# Patient Record
Sex: MALE | Race: WHITE | NOT HISPANIC OR LATINO | URBAN - METROPOLITAN AREA
[De-identification: names, ages, dates, MRNs, and addresses within clinical notes are randomized per-mention and may not be internally consistent; named-entity substitution may affect disease eponyms.]

---

## 2020-07-07 ENCOUNTER — LAB VISIT (OUTPATIENT)
Dept: LAB | Facility: HOSPITAL | Age: 60
End: 2020-07-07
Attending: FAMILY MEDICINE
Payer: COMMERCIAL

## 2020-07-07 ENCOUNTER — OFFICE VISIT (OUTPATIENT)
Dept: INTERNAL MEDICINE | Facility: CLINIC | Age: 60
End: 2020-07-07
Payer: COMMERCIAL

## 2020-07-07 VITALS
SYSTOLIC BLOOD PRESSURE: 130 MMHG | WEIGHT: 243.38 LBS | OXYGEN SATURATION: 98 % | DIASTOLIC BLOOD PRESSURE: 72 MMHG | HEIGHT: 70 IN | BODY MASS INDEX: 34.84 KG/M2 | TEMPERATURE: 96 F | HEART RATE: 71 BPM

## 2020-07-07 DIAGNOSIS — E78.00 HYPERCHOLESTEROLEMIA: ICD-10-CM

## 2020-07-07 DIAGNOSIS — M10.9 GOUT, UNSPECIFIED CAUSE, UNSPECIFIED CHRONICITY, UNSPECIFIED SITE: ICD-10-CM

## 2020-07-07 DIAGNOSIS — N11.9 CHRONIC PYELONEPHRITIS: Primary | ICD-10-CM

## 2020-07-07 PROCEDURE — 99203 OFFICE O/P NEW LOW 30 MIN: CPT | Mod: S$GLB,,, | Performed by: FAMILY MEDICINE

## 2020-07-07 PROCEDURE — 36415 COLL VENOUS BLD VENIPUNCTURE: CPT

## 2020-07-07 PROCEDURE — 99999 PR PBB SHADOW E&M-EST. PATIENT-LVL IV: CPT | Mod: PBBFAC,,, | Performed by: FAMILY MEDICINE

## 2020-07-07 PROCEDURE — 80053 COMPREHEN METABOLIC PANEL: CPT

## 2020-07-07 PROCEDURE — 99203 PR OFFICE/OUTPT VISIT, NEW, LEVL III, 30-44 MIN: ICD-10-PCS | Mod: S$GLB,,, | Performed by: FAMILY MEDICINE

## 2020-07-07 PROCEDURE — 99999 PR PBB SHADOW E&M-EST. PATIENT-LVL IV: ICD-10-PCS | Mod: PBBFAC,,, | Performed by: FAMILY MEDICINE

## 2020-07-07 PROCEDURE — 3008F BODY MASS INDEX DOCD: CPT | Mod: CPTII,S$GLB,, | Performed by: FAMILY MEDICINE

## 2020-07-07 PROCEDURE — 3008F PR BODY MASS INDEX (BMI) DOCUMENTED: ICD-10-PCS | Mod: CPTII,S$GLB,, | Performed by: FAMILY MEDICINE

## 2020-07-07 RX ORDER — TAMSULOSIN HYDROCHLORIDE 0.4 MG/1
0.4 CAPSULE ORAL DAILY
Qty: 90 CAPSULE | Refills: 1 | Status: SHIPPED | OUTPATIENT
Start: 2020-07-07

## 2020-07-07 RX ORDER — ATORVASTATIN CALCIUM 20 MG/1
20 TABLET, FILM COATED ORAL DAILY
Qty: 90 TABLET | Refills: 1 | Status: SHIPPED | OUTPATIENT
Start: 2020-07-07

## 2020-07-07 RX ORDER — BISOPROLOL FUMARATE 5 MG/1
2.5 TABLET, FILM COATED ORAL DAILY
Qty: 45 TABLET | Refills: 1 | Status: SHIPPED | OUTPATIENT
Start: 2020-07-07

## 2020-07-07 RX ORDER — FEBUXOSTAT 80 MG/1
TABLET, FILM COATED ORAL
Qty: 45 TABLET | Refills: 1 | Status: SHIPPED | OUTPATIENT
Start: 2020-07-07 | End: 2020-07-16

## 2020-07-07 RX ORDER — BISOPROLOL FUMARATE 5 MG/1
2.5 TABLET, FILM COATED ORAL DAILY
COMMUNITY
End: 2020-07-07 | Stop reason: SDUPTHER

## 2020-07-07 RX ORDER — ESOMEPRAZOLE MAGNESIUM 40 MG/1
40 CAPSULE, DELAYED RELEASE ORAL
COMMUNITY
End: 2020-07-07 | Stop reason: SDUPTHER

## 2020-07-07 RX ORDER — ATORVASTATIN CALCIUM 20 MG/1
20 TABLET, FILM COATED ORAL DAILY
COMMUNITY
End: 2020-07-07 | Stop reason: SDUPTHER

## 2020-07-07 RX ORDER — FEBUXOSTAT 80 MG/1
40 TABLET, FILM COATED ORAL
COMMUNITY
End: 2020-07-07 | Stop reason: SDUPTHER

## 2020-07-07 RX ORDER — TAMSULOSIN HYDROCHLORIDE 0.4 MG/1
0.4 CAPSULE ORAL DAILY
COMMUNITY
End: 2020-07-07 | Stop reason: SDUPTHER

## 2020-07-07 RX ORDER — ESOMEPRAZOLE MAGNESIUM 40 MG/1
40 CAPSULE, DELAYED RELEASE ORAL
Qty: 90 CAPSULE | Refills: 1 | Status: SHIPPED | OUTPATIENT
Start: 2020-07-07

## 2020-07-07 NOTE — PROGRESS NOTES
Subjective:       Patient ID: John Paul Green is a 60 y.o. male.    Chief Complaint: Annual Exam    Establish Care:      Pt is a 60 year old with HTN, Cholesterol and Gout, has some kidney issues 2nd to stone blockage in the left kidney. This resulted in left kidney pyelonephritis. Pt reports tub were placed in the left kidney and since has been not.     Review of Systems   Constitutional: Negative for activity change.   HENT: Negative for hearing loss and trouble swallowing.    Eyes: Negative for discharge.   Respiratory: Negative for chest tightness and wheezing.    Cardiovascular: Negative for chest pain and palpitations.   Gastrointestinal: Negative for constipation, diarrhea and vomiting.   Genitourinary: Negative for difficulty urinating and hematuria.   Neurological: Negative for headaches.   Psychiatric/Behavioral: Negative for dysphoric mood.         Objective:      Physical Exam  Constitutional:       Appearance: Normal appearance.   Neck:      Musculoskeletal: Normal range of motion.   Neurological:      Mental Status: He is alert.         Assessment:       1. Chronic pyelonephritis    2. Gout, unspecified cause, unspecified chronicity, unspecified site    3. Hypercholesterolemia        Plan:       Chronic pyelonephritis  -     Ambulatory referral/consult to Urology; Future; Expected date: 07/14/2020    Gout, unspecified cause, unspecified chronicity, unspecified site    Hypercholesterolemia  Comments:  Pt cholesterol is well controlled  Orders:  -     Comprehensive metabolic panel; Future; Expected date: 07/07/2020

## 2020-07-08 LAB
ALBUMIN SERPL BCP-MCNC: 3.9 G/DL (ref 3.5–5.2)
ALP SERPL-CCNC: 44 U/L (ref 55–135)
ALT SERPL W/O P-5'-P-CCNC: 29 U/L (ref 10–44)
ANION GAP SERPL CALC-SCNC: 9 MMOL/L (ref 8–16)
AST SERPL-CCNC: 20 U/L (ref 10–40)
BILIRUB SERPL-MCNC: 0.9 MG/DL (ref 0.1–1)
BUN SERPL-MCNC: 22 MG/DL (ref 8–23)
CALCIUM SERPL-MCNC: 9.6 MG/DL (ref 8.7–10.5)
CHLORIDE SERPL-SCNC: 105 MMOL/L (ref 95–110)
CO2 SERPL-SCNC: 26 MMOL/L (ref 23–29)
CREAT SERPL-MCNC: 1 MG/DL (ref 0.5–1.4)
EST. GFR  (AFRICAN AMERICAN): >60 ML/MIN/1.73 M^2
EST. GFR  (NON AFRICAN AMERICAN): >60 ML/MIN/1.73 M^2
GLUCOSE SERPL-MCNC: 87 MG/DL (ref 70–110)
POTASSIUM SERPL-SCNC: 4.8 MMOL/L (ref 3.5–5.1)
PROT SERPL-MCNC: 7.3 G/DL (ref 6–8.4)
SODIUM SERPL-SCNC: 140 MMOL/L (ref 136–145)

## 2020-07-15 ENCOUNTER — TELEPHONE (OUTPATIENT)
Dept: INTERNAL MEDICINE | Facility: CLINIC | Age: 60
End: 2020-07-15

## 2020-07-15 NOTE — TELEPHONE ENCOUNTER
----- Message from Miroslava Flores sent at 7/15/2020  2:34 PM CDT -----  Contact: self/[phone]  Type:  Patient Returning Call    Who Called:John Paul Green  Who Left Message for Patient:Heather  Does the patient know what this is regarding?:yes  Would the patient rather a call back or a response via [facility]? Call back   Best Call Back Number:[phone]  Additional Information:

## 2020-07-16 RX ORDER — ALLOPURINOL 100 MG/1
100 TABLET ORAL 2 TIMES DAILY
Qty: 180 TABLET | Refills: 1 | Status: SHIPPED | OUTPATIENT
Start: 2020-07-16

## 2020-08-12 ENCOUNTER — OFFICE VISIT (OUTPATIENT)
Dept: UROLOGY | Facility: CLINIC | Age: 60
End: 2020-08-12
Payer: COMMERCIAL

## 2020-08-12 ENCOUNTER — LAB VISIT (OUTPATIENT)
Dept: LAB | Facility: HOSPITAL | Age: 60
End: 2020-08-12
Attending: UROLOGY
Payer: COMMERCIAL

## 2020-08-12 VITALS
BODY MASS INDEX: 34.33 KG/M2 | DIASTOLIC BLOOD PRESSURE: 72 MMHG | TEMPERATURE: 97 F | WEIGHT: 242.5 LBS | SYSTOLIC BLOOD PRESSURE: 128 MMHG

## 2020-08-12 DIAGNOSIS — R35.1 BENIGN PROSTATIC HYPERPLASIA WITH NOCTURIA: ICD-10-CM

## 2020-08-12 DIAGNOSIS — N40.1 BENIGN PROSTATIC HYPERPLASIA WITH NOCTURIA: ICD-10-CM

## 2020-08-12 DIAGNOSIS — N20.0 NEPHROLITHIASIS: ICD-10-CM

## 2020-08-12 DIAGNOSIS — Z98.890 S/P URETERAL REIMPLANTATION: Primary | ICD-10-CM

## 2020-08-12 PROBLEM — N11.9 CHRONIC PYELONEPHRITIS: Status: ACTIVE | Noted: 2020-08-12

## 2020-08-12 PROCEDURE — 3008F BODY MASS INDEX DOCD: CPT | Mod: CPTII,S$GLB,, | Performed by: UROLOGY

## 2020-08-12 PROCEDURE — 99999 PR PBB SHADOW E&M-EST. PATIENT-LVL III: CPT | Mod: PBBFAC,,, | Performed by: UROLOGY

## 2020-08-12 PROCEDURE — 99203 OFFICE O/P NEW LOW 30 MIN: CPT | Mod: S$GLB,,, | Performed by: UROLOGY

## 2020-08-12 PROCEDURE — 3008F PR BODY MASS INDEX (BMI) DOCUMENTED: ICD-10-PCS | Mod: CPTII,S$GLB,, | Performed by: UROLOGY

## 2020-08-12 PROCEDURE — 84153 ASSAY OF PSA TOTAL: CPT

## 2020-08-12 PROCEDURE — 99999 PR PBB SHADOW E&M-EST. PATIENT-LVL III: ICD-10-PCS | Mod: PBBFAC,,, | Performed by: UROLOGY

## 2020-08-12 PROCEDURE — 36415 COLL VENOUS BLD VENIPUNCTURE: CPT

## 2020-08-12 PROCEDURE — 99203 PR OFFICE/OUTPT VISIT, NEW, LEVL III, 30-44 MIN: ICD-10-PCS | Mod: S$GLB,,, | Performed by: UROLOGY

## 2020-08-12 RX ORDER — FEBUXOSTAT 40 MG/1
40 TABLET, FILM COATED ORAL DAILY
Qty: 90 TABLET | Refills: 3 | Status: SHIPPED | OUTPATIENT
Start: 2020-08-12

## 2020-08-12 NOTE — LETTER
August 12, 2020      Bill Barreto MD  [address] [facility] [address] [facility] [address]           [facility]  [address] [facility] [address]  Phone: [phone]  Fax: [phone]          Patient: John Paul Green   MR Number: [id number]   YOB: 1960   Date of Visit: 8/12/2020       Dear Dr. Bill Barreto:    Thank you for referring John Paul Green to me for evaluation. Attached you will find relevant portions of my assessment and plan of care.    If you have questions, please do not hesitate to call me. I look forward to following John Paul Green along with you.    Sincerely,    Vincenzo Knox MD    Enclosure  CC:  No Recipients    If you would like to receive this communication electronically, please contact [email or web address] or [phone] to request more information on [facility] Link access.    For providers and/or their staff who would like to refer a patient to [facility], please contact us through our one-stop-shop provider referral line, [facility], at [phone].    If you feel you have received this communication in error or would no longer like to receive these types of communications, please e-mail [email or web address]

## 2020-08-12 NOTE — PROGRESS NOTES
Chief Complaint:   Encounter Diagnoses   Name Primary?    S/P ureteral reimplantation Yes    Nephrolithiasis     Benign prostatic hyperplasia with nocturia        HPI:  60-year-old gentleman who has significant history of left ureteral reimplantation.  In 2012 in  [place], he had obstructing chronic stone in the left ureter which could not be extracted.  Therefore the patient underwent a left ureteral reimplant, based upon the CT scans from [place] appears that he also had a psoas hitch.  Right sides been unremarkable, last Lasix renogram was years ago in demonstrated the kidney to be functioning at 20%.  Patient states he has not had any imaging in the last 2 years, he takes febuxostat 80 mg for his history of gout, they recently starting allopurinol he like to switch back over to this other medication he has a large left cyst on the kidney, no hydronephrosis.  The he takes tamsulosin for BPH, his biggest complaint is that he gets up 3-4 times per evening, with no issues during the daytime.  He only takes the tamsulosin approximately 3 times per week.  He states when he does take stent tamsulosin he has excellent stream, no gross hematuria, no microscopic hematuria, he does have a history of smoking.  No other urological history, family history of stones, but no urological cancers.  Patient states he had no stones before, is adamant that he had calcium oxalate stones.  Patient is otherwise asymptomatic today.    Allergies:  Patient has no known allergies.    Medications:  has a current medication list which includes the following prescription(s): allopurinol, atorvastatin, bisoprolol, esomeprazole, and tamsulosin.    Review of Systems:  General: No fever, chills, fatigability, or weight loss.  Skin: No rashes, itching, or changes in color or texture of skin.  Chest: Denies LEMUS, cyanosis, wheezing, cough, and sputum production.  Abdomen: Appetite fine. No weight loss. Denies diarrhea, abdominal pain, hematemesis, or  blood in stool.  Musculoskeletal: No joint stiffness or swelling. Denies back pain.  : As above.  All other review of systems negative.    PMH:   has a past medical history of Hyperlipidemia and Hypertension.    PSH:   has a past surgical history that includes Kidney surgery.    FamHx: family history is not on file.    SocHx:  reports that he has quit smoking. He does not have any smokeless tobacco history on file. No history on file for alcohol and drug.      Physical Exam:  Vitals:    08/12/20 1311   BP: 128/72   Temp: 97.2 °F (36.2 °C)     General: A&Ox3, no apparent distress, no deformities  Neck: No masses, normal ROM  Lungs: normal inspiration, no use of accessory muscles  Heart: normal pulse, no arrhythmias  Abdomen: Soft, NT, ND, no masses, no hernias, no hepatosplenomegaly  Skin: The skin is warm and dry. No jaundice.  Ext: No c/c/e.    Labs/Studies:   Urinalysis normal id/20  PSA 1.8 8/19  Creatinine 1.0 7/20    Impression/Plan:     1.  Left ureteral reimplant- this was performed in 2012, I personally reviewed the CT images from [place] and the reports, it appears that he had a left ureteral distal reimplant with possible psoas hitch, and renal function was 20% postop.  This was due to a chronic stone which could not be extracted ureteroscopically.  Patient is adamant that he has calcium oxalate stones, which they have been monitoring.  He has had no stones since then, this was his only stone.  He is currently taking febuxostat 40 mg daily for his chronic gout, refills have been sent in and he wants to stop the allopurinol.  Creatinine recently was normal.  Will follow-up with a Lasix renogram and then reassess him  in 3 months.  If he is doing well at that time we can go to yearly from there.    2.  Nephrolithiasis- this is his first ever stone, continue to monitor expectantly.    3.  BPH- continue tamsulosin but take daily, as he is only taking it 3 times per week.  I believe this will improve his  symptoms reassess at the next appointment.  Follow up on the PSA and treat as appropriate.

## 2020-08-13 ENCOUNTER — PATIENT MESSAGE (OUTPATIENT)
Dept: UROLOGY | Facility: CLINIC | Age: 60
End: 2020-08-13

## 2020-08-13 LAB — COMPLEXED PSA SERPL-MCNC: 2.1 NG/ML (ref 0–4)

## 2020-08-14 ENCOUNTER — PATIENT MESSAGE (OUTPATIENT)
Dept: UROLOGY | Facility: CLINIC | Age: 60
End: 2020-08-14

## 2021-02-01 ENCOUNTER — TELEPHONE (OUTPATIENT)
Dept: UROLOGY | Facility: CLINIC | Age: 61
End: 2021-02-01

## 2021-02-17 ENCOUNTER — TELEPHONE (OUTPATIENT)
Dept: UROLOGY | Facility: CLINIC | Age: 61
End: 2021-02-17

## 2022-10-20 ENCOUNTER — PATIENT MESSAGE (OUTPATIENT)
Dept: RESEARCH | Facility: HOSPITAL | Age: 62
End: 2022-10-20
Payer: COMMERCIAL

## 2022-11-21 ENCOUNTER — HOSPITAL ENCOUNTER (OUTPATIENT)
Dept: PHYSICAL THERAPY | Age: 62
Discharge: HOME | End: 2022-11-21
Payer: COMMERCIAL

## 2022-11-21 PROCEDURE — 97112 NEUROMUSCULAR REEDUCATION: CPT

## 2022-11-21 PROCEDURE — 97162 PT EVAL MOD COMPLEX 30 MIN: CPT

## 2022-11-21 NOTE — THERAPY EVALUATION
PHYSICAL THERAPY - DAILY TREATMENT NOTE    Patient Name: Shauna Palencia        Date: 2022  : 1960   YES Patient  Verified  Visit #:   1     Insurance: Payor: Edgar Ybarra / Plan: [employer] / Product Type: HMO /      In time: 11:08 Out time: 11:50   Total Treatment Time: 42     Medicare/BCBS [place] Time Tracking (below)   Total Timed Codes (min):  8 1:1 Treatment Time:  8     TREATMENT AREA =  C/S    SUBJECTIVE    Pain Level (on 0 to 10 scale):  2  / 10 neck   Medication Changes/New allergies or changes in medical history, any new surgeries or procedures? YES    If yes, update Summary List   Subjective Functional Status/Changes:  []  No changes reported     Pt reports neck pain x 20 years. Pt reports disc issues at C4-5. Pt reports that he has had intermittent left UE pain, which occurs every 2 years or so and usually resolves with PT. 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 Pt reports that he feels okay when he is lying in bed sleeping, but when he moves, he has pain. Pt reports that he sometimes wears a cervical collar to help with the pain, but it is not helping at this time. Pt reports that he had C/S x-ray, presents reports which shows mid to distal cervical disc disease, endplate spondylosis and facet changes, with resultant moderate to sever foraminal stenosis at C4-5 and C5-6; minimal degenerative posterior spondylolisthesis at C4-5; right neck linear soft tissue calcification, potentially carotid arterial atherosclerosis. Pt reports pain is 8-9/10 at worst, usually at night after driving too much. Pt reports intermittent left UE numbness/tingling and weakness.        OBJECTIVE    Physical Therapy Evaluation Cervical Spine     OBJECTIVE  Posture: [] WNL  Head Position: Protracted  Shoulder/Scapular Position: Protracted  C-Kyphosis:  [] increased   [] decreased   C-Lordosis:   [] increased   [x] decreased  T-Kyphosis:  [x] increased   [] decreased  T-Lordosis:   [] increased   [] decreased     TMJ: [x] N/A [] Abnormal - ROM:   Palpation:    Cervical Retraction: [] WNL    [x] Abnormal: Decreased motion, neck pain    Shoulder/Scapular Screen: [x] WNL    [] Abnormal:    Active Movements: [] N/A   [] Too acute   [] Other:  ROM  AROM  PROM Comments:pain, area   Forward flexion 55  Neck pain   Extension 25  Neck pain   SB right 35  Right neck pain   SB left  40  Left neck pain   Rotation right 55  NE   Rotation left 45  Left neck apin     Thoracic Spine: [] N/A    [] WNL   [x] Other: Decreased motion    Palpation: No tenderness to palpation  [] Min  [] Mod  [] Severe    Location:  [] Min  [] Mod  [] Severe    Location:  [] Min  [] Mod  [] Severe    Location:    Neuro Screen (myotome/dematome/felexes): [x] WNL  Myotome Level Muscle Test Myotome Level Muscle Test   C5 Shoulder Adduction - Deltoid C8 Finger Flexors   C6 Wrist Extension T1 Finger Abduction - Interossei   C7 Elbow Extension     Comments:    Upper Limb Tension Tests: [x] N/A       Ulnar: [] R    [] L    [] +    [] -       Median: [] R    [] L    [] +    [] -       Radial: [] R    [] L    [] +    [] -    Special Tests:  Cervical:        Vertebral Artery:  [] R    [] L    [] +    [] -       Alar Ligament: [] R    [] L    [] +    [] -       Transverse Lig: [] R    [] L    [] +    [] -       Spurling's:  [] R    [] L    [] +    [x] -       Distraction:  [] R    [] L    [] +    [] -       Compression: [] R    [] L    [] +    [] -    Thoracic Outlet Tests: [x] N/A       Adson's:  [] R    [] L    [] +    [] -       Hyperabduction: [] R    [] L    [] +    [] -       Gustabo's:  [] R    [] L    [] +    [] -       Chuy:  [] R    [] L    [] +    [] -    Diaphragmatic Breathing: [] Normal    [] Abnormal    Muscle Flexibility: [] N/A   Scalenes: [] WNL    [x] Tight    [x] R    [x] L   Upper Trap: [] WNL    [x] Tight    [x] R    [x] L   Levator: [] WNL [x] Tight    [x] R    [x] L   Pect. Minor: [] WNL    [x] Tight    [x] R    [x] L    Global Muscular Weakness: [] N/A   Lower Trap:   Rhomboids:   Middle Trap:   Serratus Ant:   Ext Rotators: 5/5   Other:    Other tests/comments:  Repeated supine C/S retraction - decreases neck pain, repeated seated C/S retraction - increases neck pain    8 min Neuromuscular Re-ed: See flow sheet   Rationale: decrease symptoms to improve the patient's ability to perform ADLs/IADLs, functional mobility and gait safely and independently without increased pain/symptoms      During NM min Patient Education:  YES  Reviewed HEP   [x]  Progressed/Changed HEP based on:   Initiated HEP (see below)     Other Objective/Functional Measures:    See eval    Access Code: [email or web address]  URL: [email or web address]  Date: 11/21/2022  Prepared by: Linda Ceron    Exercises  Supine Chin Tuck - 1 x daily - 7 x weekly - 1 sets - 10 reps  Seated Cervical Retraction - 1 x daily - 7 x weekly - 1 sets - 10 reps       Post Treatment Pain Level (on 0 to 10) scale:   0  / 10     ASSESSMENT    Assessment/Changes in Function:     See plan of care    Justification for Eval Code Complexity:  Patient History : HIGH - HTN, arthritis, h/o B elbow fx - left elbow ORIF  Examination HIGH - see objective  Clinical Presentation: MEDIUM  Clinical Decision Making : MEDIUM - FOTO complexity level     []  See Progress Note/Recertification   Patient will continue to benefit from skilled PT services: see plan of care   Progress toward goals / Updated goals:    See plan of care       PLAN    [x]  Upgrade activities as tolerated YES Continue plan of care   []  Discharge due to :    []  Other:      Therapist: Linda Ceron, PT    Date: 11/21/2022 Time: 11:13 AM

## 2022-11-21 NOTE — THERAPY EVALUATION
[address] [place] [address] PHYSICAL THERAPY  [address] [place] [address] [place], [address] - Phone: [phone]  Fax: [phone] / [address] [facility]  Patient Name: Nisha Marni : 1960   Medical   Diagnosis: Neck pain [M54.2] Treatment Diagnosis: Neck pain [M54.2]   Onset Date: 10/2022     Referral Source: Bill Richards MD [facility] hospitals in [place]): 2022   Prior Hospitalization: See medical history Provider #: [id number]   Prior Level of Function: Independent with ADLs, ambulation   Comorbidities: HTN, arthritis, h/o B elbow fx - left elbow ORIF, prior h/o neck pain and left UE radicular symptoms   Medications: Verified on Patient Summary List   The Plan of Care and following information is based on the information from the initial evaluation.   ===========================================================================================  Assessment / key information:  Patient is a 57 y.o. male who presents with complaints of neck pain and left UE radicular symptoms (pain, numbness/tingling, weakness), which began one month ago when he was working as an [employer] . Patient reports that driving exacerbates symptoms. Patient reports that he has had prior episodes of neck pain with left UE radicular symptoms intermittently over the past 20 years, with most recent episode 7 years ago. Patient reports that prior episodes have resolved with PT treatment. Patient demonstrates impaired posture, decreased C/S ROM, decreased position/activity tolerance and impaired ADL/IADL function. Patient would benefit from skilled PT services to address these issues and improve function.   Thank you for this referral.    C/S:   ROM  AROM  PROM Comments:pain, area   Forward flexion 55   Neck pain   Extension 25   Neck pain   SB right 35   Right neck pain   SB left  40   Left neck pain   Rotation right 55   NE   Rotation left 45   Left neck apin FOTO: 57/100  ==========================================================================================  Eval Complexity: History: HIGH Complexity :3+ comorbidities / personal factors will impact the outcome/ POC Exam:HIGH Complexity : 4+ Standardized tests and measures addressing body structure, function, activity limitation and / or participation in recreation  Presentation: MEDIUM Complexity : Evolving with changing characteristics  Clinical Decision Making:Other outcome measures FOTO complexity level  MEDIUMOverall Complexity:MEDIUM    Problem List: pain affecting function, decrease ROM, decrease strength, decrease ADL/ functional abilitiies, decrease activity tolerance, decrease flexibility/ joint mobility, and decrease transfer abilities   Treatment Plan may include any combination of the following: Therapeutic exercise, Neuromuscular reeducation, Manual therapy, Therapeutic activity, Self care/home management, Electric stim unattended , Ultrasound, Mechanical traction, Electric stim attended, Needle insertion w/o injection (1 or 2 muscles), and Needle insertion w/o injection (3+ muscles)  Patient / Family readiness to learn indicated by: asking questions, trying to perform skills, and interest  Persons(s) to be included in education: patient (P)  Barriers to Learning/Limitations: None  Measures taken:    Patient Goal (s): \"I hope become good. \"   Patient self reported health status: good  Rehabilitation Potential: good  Short Term Goals: To be accomplished in  2-3  weeks:  Patient will demonstrate compliance with HEP. Patient will report less than or equal to 4/10 pain at worst to allow improved activity tolerance. Long Term Goals: To be accomplished in  4-6  weeks:  Patient will demonstrate independence with HEP. Patient will demonstrate 55 degrees left C/S rotation AROM to facilitate ADLs, including driving.   Patient will score greater than or equal to 67 on FOTO (neck) to indicate improved activity tolerance and function. Frequency / Duration:   Patient to be seen  2  times per week for 4-6  weeks:  Patient / Caregiver education and instruction: activity modification and exercises    Therapist Signature: Telma Vega PT Date: 84/02/0886   Certification Period: NA Time: 11:14 AM   ===========================================================================================  I certify that the above Physical Therapy Services are being furnished while the patient is under my care. I agree with the treatment plan and certify that this therapy is necessary. Physician Signature:        Date:       Time:     Please sign and return to [facility] or you may fax the signed copy to [phone]. Thank you.

## 2022-11-28 ENCOUNTER — HOSPITAL ENCOUNTER (OUTPATIENT)
Dept: PHYSICAL THERAPY | Age: 62
End: 2022-11-28
Payer: COMMERCIAL

## 2022-11-29 ENCOUNTER — HOSPITAL ENCOUNTER (OUTPATIENT)
Dept: PHYSICAL THERAPY | Age: 62
End: 2022-11-29
Payer: COMMERCIAL

## 2022-12-01 ENCOUNTER — HOSPITAL ENCOUNTER (OUTPATIENT)
Dept: PHYSICAL THERAPY | Age: 62
Discharge: HOME | End: 2022-12-01
Payer: COMMERCIAL

## 2022-12-01 PROCEDURE — 97535 SELF CARE MNGMENT TRAINING: CPT

## 2022-12-01 PROCEDURE — 97110 THERAPEUTIC EXERCISES: CPT

## 2022-12-01 NOTE — PROGRESS NOTES
[facility] PHYSICAL THERAPY  [address] [place] [address] [place], [address] - Phone: [phone]  Fax: [phone]  DISCHARGE SUMMARY  Patient Name: Becki Flores : 1960   Treatment/Medical Diagnosis: Neck pain [M54.2]   Referral Source: Leon Gomez MD     Date of Initial Visit: 22 Attended Visits: 2 Missed Visits: 2     SUMMARY OF TREATMENT  Patient's POC has consisted of therex, therapeutic activities, manual therapy prn, modalities prn, pt. education, and a comprehensive HEP. Treatment strategies used to address functional mobility deficits, ROM deficits, strength deficits, analyze and address soft tissue restrictions, analyze and cue movement patterns, analyze and modify body mechanics/ergonomics, assess and modify postural abnormalities and instruct in home and community integration. CURRENT STATUS  Patient reports full compliance to HEP provided at , resulting in notable improvements in cervical AROM and reduction of paraesthesia. Patient reports that he is unable to consistently attend PT due to his work schedule, and has requested to be [address] with his HEP    GOALS/MEASURE OF PROGRESS Goal Met? Short Term Goals:   Patient will demonstrate compliance with HEP. Patient will report less than or equal to 4/10 pain at worst to allow improved activity tolerance. Long Term Goals:  Patient will demonstrate independence with HEP. Patient will demonstrate 55 degrees left C/S rotation AROM to facilitate ADLs, including driving. Patient will score greater than or equal to 67 on FOTO (neck) to indicate improved activity tolerance and function. MET    MET        MET    MET      NT     RECOMMENDATIONS  DC from PT per patient request    If you have any questions/comments please contact us directly at [phone]. Thank you for allowing us to assist in the care of your patient. Therapist Signature: Lashell Kimball \"BJ\" Yodit Chaudhary, SEBASTIAN, Cert. MDT, Cert. DN, Cert.  SMT, Dip. Osteopractic Date: 12/1/22   Reporting Period: N/a     Certification Period N/a Time: 3:02 PM     NOTE TO PHYSICIAN:  PLEASE COMPLETE THE ORDERS BELOW AND FAX TO   [facility]: [phone]  If you are unable to process this request in 24 hours please contact our office: [phone]    ___ I have read the above report and request that my patient continue as recommended.   ___ I have read the above report and request that my patient continue therapy with the following changes/special instructions:_________________________________________________________   ___ I have read the above report and request that my patient be discharged from therapy.      Physician Signature:        Date:     Time:

## 2022-12-01 NOTE — PROGRESS NOTES
PHYSICAL THERAPY - DAILY TREATMENT NOTE    Patient Name: Elmo Payor        Date: 2022  : 1960   YES Patient  Verified  Visit #:   2   of   8  Insurance: Payor: Jada Mix / Plan: Tunde Hogan / Product Type: HMO /      In time: 2:30 Out time: 3:00   Total Treatment Time: 30     Medicare/BCBS Time Tracking (below)   Total Timed Codes (min):  30 1:1 Treatment Time:  30     TREATMENT AREA = Neck pain [M54.2]    SUBJECTIVE    Pain Level (on 0 to 10 scale):  0  / 10   Medication Changes/New allergies or changes in medical history, any new surgeries or procedures? NO    If yes, update Summary List   Subjective Functional Status/Changes:  []  No changes reported     \"I'm much better than before. Can I make this my last session? I have a lot of trouble coming in.  If you could please just provide me with the exercises and I'll do them on my own\"          OBJECTIVE     Therapeutic Procedures:  Min Procedure Specifics + Rationale   n/a [x]  Patient Education (performed throughout session) [x] Review HEP    [] Progressed/Changed HEP based on:   [] proper performance and advancement of Therex/TA   [] reduction in pain level    [] increased functional capacity       [] change in directional preference   22 [x] Therapeutic Exercise   [x]  See Flowsheet   Rationale: increase ROM and increase strength to improve the patients ability to participate in ADL's    8 [x]SC/Home MGMT   []  See Flowsheet  DC planning  Rationale: reeducation of movement, balance, coordination, kinesthetic sense, posture, and/or proprioception to improve the patients ability to safely participate in ADL's         [x] Skin assessment post-treatment:  [x]intact [x]redness- no adverse reaction       []redness - adverse reaction:       Other Objective/Functional Measures:    See DC     Post Treatment Pain Level (on 0 to 10) scale:   0  / 10     ASSESSMENT    Assessment/Changes in Function:       See DC         Progress toward goals / Updated goals:    See DC     PLAN    [x]  Upgrade activities as tolerated  [x]  Update interventions per flow sheet YES Continue plan of care   []  Discharge due to :    []  Other:      Therapist: Bunny Holly \"BJ\" Efrain, DPT, Cert. MDT, Cert. DN, Cert. SMT, Dip.  Osteopractic    Date: 12/1/2022 Time: 2:32 PM     Future Appointments   Date Time Provider Henok Cuenca   12/1/2022  2:45 PM Julian Ritter, PT [facility]   12/6/2022  2:45 PM Alicia Elders, PT [facility]   12/8/2022  2:45 PM Alicia Elders, PT [facility]   12/13/2022  2:45 PM Alicia Elders, PT [facility]   12/15/2022  2:45 PM Alicia Elders, PT [facility]   12/20/2022  2:45 PM Eileen Andrews, PT [facility]   12/22/2022  2:00 PM Alicia Elders, PT [facility]   12/27/2022  2:45 PM Alicia Elders, PT [facility]   12/29/2022  2:45 PM Alicia Elders, PT [facility]

## 2022-12-06 ENCOUNTER — APPOINTMENT (OUTPATIENT)
Dept: PHYSICAL THERAPY | Age: 62
End: 2022-12-06
Payer: COMMERCIAL

## 2022-12-08 ENCOUNTER — APPOINTMENT (OUTPATIENT)
Dept: PHYSICAL THERAPY | Age: 62
End: 2022-12-08
Payer: COMMERCIAL

## 2022-12-13 ENCOUNTER — APPOINTMENT (OUTPATIENT)
Dept: PHYSICAL THERAPY | Age: 62
End: 2022-12-13
Payer: COMMERCIAL

## 2022-12-15 ENCOUNTER — APPOINTMENT (OUTPATIENT)
Dept: PHYSICAL THERAPY | Age: 62
End: 2022-12-15
Payer: COMMERCIAL

## 2022-12-20 ENCOUNTER — APPOINTMENT (OUTPATIENT)
Dept: PHYSICAL THERAPY | Age: 62
End: 2022-12-20
Payer: COMMERCIAL

## 2022-12-22 ENCOUNTER — APPOINTMENT (OUTPATIENT)
Dept: PHYSICAL THERAPY | Age: 62
End: 2022-12-22
Payer: COMMERCIAL

## 2022-12-27 ENCOUNTER — APPOINTMENT (OUTPATIENT)
Dept: PHYSICAL THERAPY | Age: 62
End: 2022-12-27
Payer: COMMERCIAL

## 2022-12-29 ENCOUNTER — APPOINTMENT (OUTPATIENT)
Dept: PHYSICAL THERAPY | Age: 62
End: 2022-12-29
Payer: COMMERCIAL